# Patient Record
Sex: FEMALE | Race: OTHER | HISPANIC OR LATINO
[De-identification: names, ages, dates, MRNs, and addresses within clinical notes are randomized per-mention and may not be internally consistent; named-entity substitution may affect disease eponyms.]

---

## 2018-04-19 PROBLEM — Z00.00 ENCOUNTER FOR PREVENTIVE HEALTH EXAMINATION: Status: ACTIVE | Noted: 2018-04-19

## 2018-05-07 ENCOUNTER — APPOINTMENT (OUTPATIENT)
Dept: ORTHOPEDIC SURGERY | Facility: CLINIC | Age: 63
End: 2018-05-07
Payer: COMMERCIAL

## 2018-05-07 VITALS — RESPIRATION RATE: 16 BRPM | HEIGHT: 65 IN | WEIGHT: 118 LBS | BODY MASS INDEX: 19.66 KG/M2

## 2018-05-07 DIAGNOSIS — Z87.39 PERSONAL HISTORY OF OTHER DISEASES OF THE MUSCULOSKELETAL SYSTEM AND CONNECTIVE TISSUE: ICD-10-CM

## 2018-05-07 DIAGNOSIS — Z80.0 FAMILY HISTORY OF MALIGNANT NEOPLASM OF DIGESTIVE ORGANS: ICD-10-CM

## 2018-05-07 DIAGNOSIS — M65.331 TRIGGER FINGER, RIGHT MIDDLE FINGER: ICD-10-CM

## 2018-05-07 DIAGNOSIS — Z78.9 OTHER SPECIFIED HEALTH STATUS: ICD-10-CM

## 2018-05-07 DIAGNOSIS — Z87.09 PERSONAL HISTORY OF OTHER DISEASES OF THE RESPIRATORY SYSTEM: ICD-10-CM

## 2018-05-07 PROCEDURE — 99203 OFFICE O/P NEW LOW 30 MIN: CPT | Mod: 25

## 2018-05-07 PROCEDURE — 20550 NJX 1 TENDON SHEATH/LIGAMENT: CPT | Mod: RT

## 2018-05-07 PROCEDURE — 73120 X-RAY EXAM OF HAND: CPT | Mod: LT

## 2018-05-07 RX ORDER — LORAZEPAM 2 MG/1
TABLET ORAL
Refills: 0 | Status: ACTIVE | COMMUNITY

## 2018-05-07 RX ORDER — ALBUTEROL 90 MCG
AEROSOL (GRAM) INHALATION
Refills: 0 | Status: ACTIVE | COMMUNITY

## 2018-05-07 RX ORDER — LORATADINE 5 MG/5 ML
SOLUTION, ORAL ORAL
Refills: 0 | Status: ACTIVE | COMMUNITY

## 2018-05-07 RX ORDER — DESIPRAMINE 100 MG/1
100 TABLET, FILM COATED ORAL
Refills: 0 | Status: ACTIVE | COMMUNITY

## 2018-05-07 RX ORDER — FEXOFENADINE HCL 60 MG
CAPSULE ORAL
Refills: 0 | Status: ACTIVE | COMMUNITY

## 2021-05-11 ENCOUNTER — EMERGENCY (EMERGENCY)
Facility: HOSPITAL | Age: 66
LOS: 1 days | Discharge: HOME | End: 2021-05-11
Attending: EMERGENCY MEDICINE | Admitting: EMERGENCY MEDICINE
Payer: MEDICARE

## 2021-05-11 VITALS
SYSTOLIC BLOOD PRESSURE: 124 MMHG | WEIGHT: 119.93 LBS | TEMPERATURE: 98 F | OXYGEN SATURATION: 98 % | HEART RATE: 92 BPM | HEIGHT: 65 IN | DIASTOLIC BLOOD PRESSURE: 91 MMHG | RESPIRATION RATE: 18 BRPM

## 2021-05-11 VITALS
TEMPERATURE: 98 F | SYSTOLIC BLOOD PRESSURE: 118 MMHG | HEART RATE: 84 BPM | DIASTOLIC BLOOD PRESSURE: 84 MMHG | OXYGEN SATURATION: 99 % | RESPIRATION RATE: 18 BRPM

## 2021-05-11 DIAGNOSIS — F41.9 ANXIETY DISORDER, UNSPECIFIED: ICD-10-CM

## 2021-05-11 DIAGNOSIS — Z20.822 CONTACT WITH AND (SUSPECTED) EXPOSURE TO COVID-19: ICD-10-CM

## 2021-05-11 DIAGNOSIS — R06.02 SHORTNESS OF BREATH: ICD-10-CM

## 2021-05-11 DIAGNOSIS — J45.909 UNSPECIFIED ASTHMA, UNCOMPLICATED: ICD-10-CM

## 2021-05-11 LAB
APTT BLD: 30.9 SEC — SIGNIFICANT CHANGE UP (ref 27.5–35.5)
BASE EXCESS BLDV CALC-SCNC: 5.6 MMOL/L — HIGH (ref -2–3)
CO2 BLDV-SCNC: 35.3 MMOL/L — HIGH (ref 22–26)
D DIMER BLD IA.RAPID-MCNC: <150 NG/ML DDU — SIGNIFICANT CHANGE UP
HCO3 BLDV-SCNC: 33 MMOL/L — HIGH (ref 22–29)
INR BLD: 0.96 — SIGNIFICANT CHANGE UP (ref 0.88–1.16)
LIDOCAIN IGE QN: 16 U/L — SIGNIFICANT CHANGE UP (ref 7–60)
MAGNESIUM SERPL-MCNC: 2.2 MG/DL — SIGNIFICANT CHANGE UP (ref 1.6–2.6)
NT-PROBNP SERPL-SCNC: 24 PG/ML — SIGNIFICANT CHANGE UP (ref 0–300)
PCO2 BLDV: 62 MMHG — HIGH (ref 39–42)
PH BLDV: 7.34 — SIGNIFICANT CHANGE UP (ref 7.32–7.43)
PO2 BLDV: 32 MMHG — SIGNIFICANT CHANGE UP
PROTHROM AB SERPL-ACNC: 11.5 SEC — SIGNIFICANT CHANGE UP (ref 10.6–13.6)
SAO2 % BLDV: 47.4 % — SIGNIFICANT CHANGE UP
TROPONIN T SERPL-MCNC: 0.01 NG/ML — SIGNIFICANT CHANGE UP (ref 0–0.01)

## 2021-05-11 PROCEDURE — 84484 ASSAY OF TROPONIN QUANT: CPT

## 2021-05-11 PROCEDURE — 99285 EMERGENCY DEPT VISIT HI MDM: CPT | Mod: CS

## 2021-05-11 PROCEDURE — 82803 BLOOD GASES ANY COMBINATION: CPT

## 2021-05-11 PROCEDURE — 83690 ASSAY OF LIPASE: CPT

## 2021-05-11 PROCEDURE — 93005 ELECTROCARDIOGRAM TRACING: CPT

## 2021-05-11 PROCEDURE — 36000 PLACE NEEDLE IN VEIN: CPT

## 2021-05-11 PROCEDURE — 93010 ELECTROCARDIOGRAM REPORT: CPT

## 2021-05-11 PROCEDURE — 94640 AIRWAY INHALATION TREATMENT: CPT

## 2021-05-11 PROCEDURE — 71045 X-RAY EXAM CHEST 1 VIEW: CPT | Mod: 26

## 2021-05-11 PROCEDURE — 36415 COLL VENOUS BLD VENIPUNCTURE: CPT

## 2021-05-11 PROCEDURE — 85610 PROTHROMBIN TIME: CPT

## 2021-05-11 PROCEDURE — 85025 COMPLETE CBC W/AUTO DIFF WBC: CPT

## 2021-05-11 PROCEDURE — U0005: CPT

## 2021-05-11 PROCEDURE — 80053 COMPREHEN METABOLIC PANEL: CPT

## 2021-05-11 PROCEDURE — 83735 ASSAY OF MAGNESIUM: CPT

## 2021-05-11 PROCEDURE — 85730 THROMBOPLASTIN TIME PARTIAL: CPT

## 2021-05-11 PROCEDURE — 83880 ASSAY OF NATRIURETIC PEPTIDE: CPT

## 2021-05-11 PROCEDURE — 85379 FIBRIN DEGRADATION QUANT: CPT

## 2021-05-11 PROCEDURE — 71045 X-RAY EXAM CHEST 1 VIEW: CPT

## 2021-05-11 PROCEDURE — U0003: CPT

## 2021-05-11 PROCEDURE — 99284 EMERGENCY DEPT VISIT MOD MDM: CPT | Mod: 25

## 2021-05-11 RX ORDER — ALBUTEROL 90 UG/1
1 AEROSOL, METERED ORAL
Qty: 1 | Refills: 0
Start: 2021-05-11

## 2021-05-11 RX ORDER — IPRATROPIUM/ALBUTEROL SULFATE 18-103MCG
3 AEROSOL WITH ADAPTER (GRAM) INHALATION ONCE
Refills: 0 | Status: COMPLETED | OUTPATIENT
Start: 2021-05-11 | End: 2021-05-11

## 2021-05-11 RX ADMIN — Medication 3 MILLILITER(S): at 16:16

## 2021-05-11 NOTE — ED PROVIDER NOTE - NSFOLLOWUPINSTRUCTIONS_ED_ALL_ED_FT
Please follow up with your primary physician in 1-2 days for re evaluation.  Please return to ER immediately should your symptoms worsen or if you have any concern prior to this recommended follow up.          Dyspnea    WHAT YOU NEED TO KNOW:    Dyspnea is breathing difficulty or discomfort. You may have labored, painful, or shallow breathing. You may feel breathless or short of breath. Dyspnea can occur during rest or with activity. You may have dyspnea for a short time, or it might become chronic. Dyspnea is often a symptom of a disease or condition.    DISCHARGE INSTRUCTIONS:    Return to the emergency department if:   •Your signs and symptoms are the same or worse within 24 hours of treatment.       •You have shaking chills or a fever over 102°F.       •You have new pain, pressure, or tightness in your chest.       •You have a new or worse cough or wheezing, or you cough up blood.      •You feel like you cannot get enough air.      •The skin over your ribs or on your neck sinks in when you breathe.       •You have a severe headache with vomiting and abdominal pain.       •You feel confused or dizzy.      Contact your healthcare provider or specialist if:   •You have questions or concerns about your condition or care.          Medicines:    •Medicines may be used to treat the cause of your dyspnea. Medicines may reduce swelling in your airway or decrease extra fluid from around your heart or lungs. Other medicines may be used to decrease anxiety and help you feel calm and relaxed.      •Take your medicine as directed. Contact your healthcare provider if you think your medicine is not helping or if you have side effects. Tell him or her if you are allergic to any medicine. Keep a list of the medicines, vitamins, and herbs you take. Include the amounts, and when and why you take them. Bring the list or the pill bottles to follow-up visits. Carry your medicine list with you in case of an emergency.      Manage long-term dyspnea:   •Create an action plan. You and your healthcare provider can work together to create a plan for how to handle episodes of dyspnea. The plan can include daily activities, treatment changes, and what to do if you have severe breathing problems.      •Lean forward on your elbows when you sit. This helps your lungs expand and may make it easier to breathe.      •Use pursed-lip breathing any time you feel short of breath. Breathe in through your nose and then slowly breathe out through your mouth with your lips slightly puckered. It should take you twice as long to breathe out as it did to breathe in.  Breathe in Breathe out           •Do not smoke. Nicotine and other chemicals in cigarettes and cigars can cause lung damage and make it harder to breathe. Ask your healthcare provider for information if you currently smoke and need help to quit. E-cigarettes or smokeless tobacco still contain nicotine. Talk to your healthcare provider before you use these products.       •Reach or maintain a healthy weight. Your healthcare provider can help you create a safe weight loss plan if you are overweight.      •Exercise as directed. Exercise can help your lungs work more easily. Exercise can also help you lose weight if needed. Try to get at least 30 minutes of exercise most days of the week. Your healthcare provider can help you create an exercise plan that is safe for you.      Follow up with your healthcare provider or specialist as directed: Write down your questions so you remember to ask them during your visits.       © Copyright [employer] 2021           back to top                          © Copyright [employer] 2021 Please follow up with your primary physician in 1-2 days for re evaluation.  You are also given cardiology and pulmonology follow up information.  Please return to ER immediately should your symptoms worsen or if you have any concern prior to this recommended follow up.          Dyspnea    WHAT YOU NEED TO KNOW:    Dyspnea is breathing difficulty or discomfort. You may have labored, painful, or shallow breathing. You may feel breathless or short of breath. Dyspnea can occur during rest or with activity. You may have dyspnea for a short time, or it might become chronic. Dyspnea is often a symptom of a disease or condition.    DISCHARGE INSTRUCTIONS:    Return to the emergency department if:   •Your signs and symptoms are the same or worse within 24 hours of treatment.       •You have shaking chills or a fever over 102°F.       •You have new pain, pressure, or tightness in your chest.       •You have a new or worse cough or wheezing, or you cough up blood.      •You feel like you cannot get enough air.      •The skin over your ribs or on your neck sinks in when you breathe.       •You have a severe headache with vomiting and abdominal pain.       •You feel confused or dizzy.      Contact your healthcare provider or specialist if:   •You have questions or concerns about your condition or care.          Medicines:    •Medicines may be used to treat the cause of your dyspnea. Medicines may reduce swelling in your airway or decrease extra fluid from around your heart or lungs. Other medicines may be used to decrease anxiety and help you feel calm and relaxed.      •Take your medicine as directed. Contact your healthcare provider if you think your medicine is not helping or if you have side effects. Tell him or her if you are allergic to any medicine. Keep a list of the medicines, vitamins, and herbs you take. Include the amounts, and when and why you take them. Bring the list or the pill bottles to follow-up visits. Carry your medicine list with you in case of an emergency.      Manage long-term dyspnea:   •Create an action plan. You and your healthcare provider can work together to create a plan for how to handle episodes of dyspnea. The plan can include daily activities, treatment changes, and what to do if you have severe breathing problems.      •Lean forward on your elbows when you sit. This helps your lungs expand and may make it easier to breathe.      •Use pursed-lip breathing any time you feel short of breath. Breathe in through your nose and then slowly breathe out through your mouth with your lips slightly puckered. It should take you twice as long to breathe out as it did to breathe in.  Breathe in Breathe out           •Do not smoke. Nicotine and other chemicals in cigarettes and cigars can cause lung damage and make it harder to breathe. Ask your healthcare provider for information if you currently smoke and need help to quit. E-cigarettes or smokeless tobacco still contain nicotine. Talk to your healthcare provider before you use these products.       •Reach or maintain a healthy weight. Your healthcare provider can help you create a safe weight loss plan if you are overweight.      •Exercise as directed. Exercise can help your lungs work more easily. Exercise can also help you lose weight if needed. Try to get at least 30 minutes of exercise most days of the week. Your healthcare provider can help you create an exercise plan that is safe for you.      Follow up with your healthcare provider or specialist as directed: Write down your questions so you remember to ask them during your visits.       © Copyright [employer] 2021           back to top                          © Copyright [employer] 2021

## 2021-05-11 NOTE — ED PROVIDER NOTE - CARE PROVIDER_API CALL
Bessy Sales)  Critical Care Medicine; Pulmonary Disease  [address], [address] [place] [id number]  Phone: [phone]  Fax: [phone]  Follow Up Time:     Feliciano Burton  CARDIOVASCULAR DISEASE  [address] [address]  Phone: [phone]  Fax: [phone]  Follow Up Time:

## 2021-05-11 NOTE — ED PROVIDER NOTE - CARE PROVIDERS DIRECT ADDRESSES
[email or web address]

## 2021-05-11 NOTE — ED PROVIDER NOTE - PHYSICAL EXAMINATION
VITAL SIGNS: I have reviewed nursing notes and confirm.  CONSTITUTIONAL: Well-developed; well-nourished; in no acute distress.   SKIN:  Warm and dry, no acute rash.   HEAD:  normocephalic, atraumatic.  EYES: PERRL.  EOM intact; conjunctiva and sclera clear.  ENT: No nasal discharge; airway clear.   NECK: Supple; non tender.  CARD: S1, S2 normal; no murmurs, gallops, or rubs. Regular rate and rhythm.   RESP:  No conversational dyspnea, no increased work of breathing.  Clear to auscultation b/l, no wheezes, rales or rhonchi.  ABD: Normal bowel sounds; soft; non-distended; non-tender; no guarding/rebound.  EXT: Normal ROM. No clubbing, cyanosis or edema. 2+ pulses to b/l ue/le.  NEURO: Alert, oriented, grossly unremarkable.  5/5 strength x 4 extremities against gravity and external force.  No drift x 4 extremities.  Sensation intact and symmetric x 4 extremities.  No facial asymmetry.    PSYCH: Cooperative, mood and affect appropriate.

## 2021-05-11 NOTE — ED PROVIDER NOTE - NS ED ROS FT
Constitutional: No fever or chills.   Eyes: No pain, blurry vision, or discharge.  ENMT: No hearing changes, pain, discharge or infections. No neck pain or stiffness.  Cardiac: No chest pain, SOB or edema. No chest pain with exertion.  Respiratory: + SOB.  No cough or respiratory distress. No hemoptysis. + History of asthma.  GI: No nausea, vomiting, diarrhea or abdominal pain.  : No dysuria, frequency or burning.  MS: No myalgia, muscle weakness, joint pain or back pain.  Neuro: No headache or weakness. No LOC.  Skin: No skin rash.   Endocrine: No history of thyroid disease or diabetes.  Except as documented in the HPI, all other systems are negative.

## 2021-05-11 NOTE — ED PROVIDER NOTE - PATIENT PORTAL LINK FT
You can access the FollowMyHealth Patient Portal offered by [facility] by registering at the following website: [email or web address]. By joining [employer]’s FollowMyHealth portal, you will also be able to view your health information using other applications (apps) compatible with our system.

## 2021-05-11 NOTE — ED PROVIDER NOTE - CLINICAL SUMMARY MEDICAL DECISION MAKING FREE TEXT BOX
Patient presents to ED with concern for shortness of breath - sent from PCP office for further evaluation.  On exam, patient is non toxic, in no respiratory distress.  Lungs are clear to auscultation, bilaterally and she exhibits no increased work of breathing.  Labs are reviewed, CXR completed. Patient is given neb due to possible RAD as etiology of symptoms, despite no wheezes, etc.  D-dimer, trop ******************.  CXR ***************.  Patient is aware of all results as well as recommendation for prompt PCP follow up for re evaluation.  Will send refill for inhaler as patient notes it is near empty.  She is aware she will have to follow up for COVID results, though low suspicion for COVID at this time.  Patient is advised to follow up with their PCP in 1-2 days without fail.  Patient instructed to return to ED immediately should their symptoms worsen or if there is any concern prior to the recommended PCP follow up.  Patient is aware of plan and verbalizes their understanding.  Will discharge home at this time. Patient presents to ED with concern for shortness of breath - sent from PCP office for further evaluation.  On exam, patient is non toxic, in no respiratory distress.  Lungs are clear to auscultation, bilaterally and she exhibits no increased work of breathing.  Labs are reviewed, CXR completed. Patient is given neb due to possible RAD as etiology of symptoms, despite no wheezes, etc.  D-dimer, trop negative/WNL.  CXR clear.  Patient is aware of all results as well as recommendation for prompt PCP follow up for re evaluation.  Will send refill for inhaler as patient notes it is near empty.  She is aware she will have to follow up for COVID results, though low suspicion for COVID at this time.  Patient is advised to follow up with their PCP in 1-2 days without fail.  Patient instructed to return to ED immediately should their symptoms worsen or if there is any concern prior to the recommended PCP follow up.  Patient is aware of plan and verbalizes their understanding.  Will discharge home at this time. Patient presents to ED with concern for shortness of breath - sent from PCP office for further evaluation.  On exam, patient is non toxic, in no respiratory distress.  Lungs are clear to auscultation, bilaterally and she exhibits no increased work of breathing.  Labs are reviewed, CXR completed. Patient is given neb due to possible RAD as etiology of symptoms, despite no wheezes, etc.  D-dimer, trop negative/WNL.  CXR clear.  EKG NSR without ischemic changes.  Patient is aware of all results as well as recommendation for prompt PCP follow up for re evaluation.  Will send refill for inhaler as patient notes it is near empty.  She is aware she will have to follow up for COVID results, though low suspicion for COVID at this time.  Attempt is made to contact PCP and discuss ED eval, results, etc however no call back.  Patient is advised to follow up with their PCP in 1-2 days without fail.  She is also given pulmonology and cardiology follow up information.  Patient instructed to return to ED immediately should their symptoms worsen or if there is any concern prior to the recommended PCP follow up.  Patient is aware of plan and verbalizes their understanding.  Will discharge home at this time.

## 2021-05-11 NOTE — ED ADULT NURSE NOTE - NSIMPLEMENTINTERV_GEN_ALL_ED
Implemented All Universal Safety Interventions:  [place] to call system. Call bell, personal items and telephone within reach. Instruct patient to call for assistance. Room bathroom lighting operational. Non-slip footwear when patient is off stretcher. Physically safe environment: no spills, clutter or unnecessary equipment. Stretcher in lowest position, wheels locked, appropriate side rails in place.

## 2021-05-11 NOTE — ED PROVIDER NOTE - OBJECTIVE STATEMENT
65 year old female with history of asthma, anxiety (prescribed ativan PRN by her psychiatrist) presents to ED at her PCP request secondary to concern for "abnormal EKG" and shortness of breath.  Patient notes shortness of breath intermittently over the past several days, noting it improves when she takes her ativan.  She states she has been using inhalers in addition to ativan, and does feel like her symptoms improve with these therapies, however given she uses them concurrently, is not sure which is helping her more.  She denies SOB currently/on arrival to ED.  No chest pain, headaches, visual changes, abdominal pain, nausea, emesis, changes to bowel movements, peripheral edema, rashes, recent travel, known sick contacts, or additional acute complaints or concerns are noted at this time.

## 2021-05-11 NOTE — ED ADULT NURSE NOTE - OBJECTIVE STATEMENT
64 yo female BIBA c/o new onset a. fib. Pt. reports having SOB x approximately 1.5 weeks and thought SOB was due to anxiety, reports taking prescribed Ativan PO 2 days ago with some relief but SOB returned today. Pt. went to see PCP and EKG was done, which she reports being told was new onset a. fib. H/O asthma. Denies chest pain, palpitations, fever/chills, dizziness, n/v/d.

## 2021-05-11 NOTE — ED ADULT TRIAGE NOTE - CHIEF COMPLAINT QUOTE
Patient BIBA from MD office/[facility], c/o of 1 wk intermittent left sided and midsternal chest pain w/ mild SOB and intermittent dyspnea on exertion, sent to ED due to " irregular EKG" .  O2 sat 98% on RA, no difficulty speaking in long sentences, no diaphoresis, syncope.  PMHx Asthma, Pre-DM  .   EKG in progress.

## 2021-05-11 NOTE — ED ADULT NURSE NOTE - CHIEF COMPLAINT QUOTE
Patient BIBA from MD office/[facility], c/o of 1 wk intermittent left sided and midsternal chest pain w/ mild SOB and intermittent dyspnea on exertion, sent to ED due to " irregular EKG" .  O2 sat 98% on RA, no difficulty speaking in long sentences, no diaphoresis, syncope.  PMHx Asthma, Pre-DM  .   EKG in progress.

## 2021-05-21 PROBLEM — F41.9 ANXIETY DISORDER, UNSPECIFIED: Chronic | Status: ACTIVE | Noted: 2021-05-11

## 2021-05-21 PROBLEM — J45.909 UNSPECIFIED ASTHMA, UNCOMPLICATED: Chronic | Status: ACTIVE | Noted: 2021-05-11

## 2021-06-07 ENCOUNTER — APPOINTMENT (OUTPATIENT)
Dept: PULMONOLOGY | Facility: CLINIC | Age: 66
End: 2021-06-07
Payer: MEDICARE

## 2021-06-07 ENCOUNTER — NON-APPOINTMENT (OUTPATIENT)
Age: 66
End: 2021-06-07

## 2021-06-07 VITALS
DIASTOLIC BLOOD PRESSURE: 74 MMHG | HEART RATE: 97 BPM | OXYGEN SATURATION: 99 % | WEIGHT: 122 LBS | BODY MASS INDEX: 20.33 KG/M2 | SYSTOLIC BLOOD PRESSURE: 126 MMHG | HEIGHT: 65 IN | TEMPERATURE: 97.3 F | RESPIRATION RATE: 12 BRPM

## 2021-06-07 DIAGNOSIS — R07.89 OTHER CHEST PAIN: ICD-10-CM

## 2021-06-07 DIAGNOSIS — J45.20 MILD INTERMITTENT ASTHMA, UNCOMPLICATED: ICD-10-CM

## 2021-06-07 PROCEDURE — 99203 OFFICE O/P NEW LOW 30 MIN: CPT

## 2021-06-07 NOTE — ASSESSMENT
[FreeTextEntry1] : Data reviewed: \par CXR 5-11-21: clear \par d-dimer: normal\par \par \par Impression/Plan: \par Mild intermittent asthma\par Atypical chest pain and episodic shortness of breath - relived by ativan, likely anxiety \par \par - continue albuterol PRN\par - obtain PFTs\par - she has an ECHO and stress test scheduled for tomorrow\par \par

## 2021-06-07 NOTE — PHYSICAL EXAM
[No Acute Distress] : no acute distress [Normal Oropharynx] : normal oropharynx [Supple] : supple [Normal Rate/Rhythm] : normal rate/rhythm [Normal S1, S2] : normal s1, s2 [No Resp Distress] : no resp distress [No Acc Muscle Use] : no acc muscle use [Clear to Auscultation Bilaterally] : clear to auscultation bilaterally [No Abnormalities] : no abnormalities [Not Tender] : not tender [Normal Gait] : normal gait [No Clubbing] : no clubbing [No Edema] : no edema [Oriented x3] : oriented x3 [TextBox_140] : anxious affect

## 2021-06-07 NOTE — REVIEW OF SYSTEMS
[Dyspnea] : dyspnea [Chest Discomfort] : chest discomfort [Seasonal Allergies] : seasonal allergies [Depression] : depression [Anxiety] : anxiety [Panic Attacks] : panic attacks [Negative] : Endocrine [Cough] : no cough [Chest Tightness] : no chest tightness [SOB on Exertion] : no sob on exertion [Edema] : no edema [Orthopnea] : no orthopnea [Palpitations] : no palpitations [Syncope] : no syncope [Headache] : no headache [Dizziness] : no dizziness

## 2021-06-07 NOTE — HISTORY OF PRESENT ILLNESS
[TextBox_4] : 64 yo F, never smoker, with past medical history of anxiety, depression, mild intermittent asthma here with complains of episodes of shortness of breath and chest pain. \par With regards to her asthma, she was diagnosed many years ago but she has only needed to use albuterol once or twice a year. Never been intubated, or hospitalized for her asthma. Triggers are dust and seasonal allergies. She takes allegra PRN for allergies.\par Earlier in May she experienced episodic "difficulty breathing" and right sided chest pain (pin point). She used albuterol a few times without relief, and then she took ativan with improvement of all symptoms. She saw her PCP during this time who did an EKH and told her that "it was flutter" and referred her to [facility] ER. During her ER visit, EKG, CXR, d-dimer were nromal. A VBG was done which showed hypercapnia. Patient received albuterol neb during her ER visit without relief. \par Of note: in recent times, she is under a lot of stress. Her mother also passed away 6 months ago and patient reports that her anxiety has been bad. also notes that each time she gets this chest pain and shortness of breath, it is relieved by ativan. \par \par

## 2021-06-15 ENCOUNTER — NON-APPOINTMENT (OUTPATIENT)
Age: 66
End: 2021-06-15

## 2021-06-24 ENCOUNTER — APPOINTMENT (OUTPATIENT)
Dept: PULMONOLOGY | Facility: CLINIC | Age: 66
End: 2021-06-24
Payer: MEDICARE

## 2021-06-24 ENCOUNTER — TRANSCRIPTION ENCOUNTER (OUTPATIENT)
Age: 66
End: 2021-06-24

## 2021-06-24 PROCEDURE — 94729 DIFFUSING CAPACITY: CPT

## 2021-06-24 PROCEDURE — 94010 BREATHING CAPACITY TEST: CPT

## 2021-06-24 PROCEDURE — ZZZZZ: CPT

## 2021-06-24 PROCEDURE — 94727 GAS DIL/WSHOT DETER LNG VOL: CPT
